# Patient Record
Sex: MALE | Race: WHITE
[De-identification: names, ages, dates, MRNs, and addresses within clinical notes are randomized per-mention and may not be internally consistent; named-entity substitution may affect disease eponyms.]

---

## 2019-03-22 ENCOUNTER — HOSPITAL ENCOUNTER (EMERGENCY)
Dept: HOSPITAL 25 - UCCORT | Age: 69
Discharge: HOME | End: 2019-03-22
Payer: COMMERCIAL

## 2019-03-22 VITALS — SYSTOLIC BLOOD PRESSURE: 183 MMHG | DIASTOLIC BLOOD PRESSURE: 90 MMHG

## 2019-03-22 DIAGNOSIS — R60.0: Primary | ICD-10-CM

## 2019-03-22 DIAGNOSIS — Z87.891: ICD-10-CM

## 2019-03-22 DIAGNOSIS — Z79.899: ICD-10-CM

## 2019-03-22 DIAGNOSIS — I10: ICD-10-CM

## 2019-03-22 DIAGNOSIS — L29.9: ICD-10-CM

## 2019-03-22 PROCEDURE — G0463 HOSPITAL OUTPT CLINIC VISIT: HCPCS

## 2019-03-22 PROCEDURE — 99201: CPT

## 2019-03-22 NOTE — UC
Lower Extremity/Ankle HPI





- HPI Summary


HPI Summary: 





Pt presents with c/o gradual onset of worsening bilateral lower extremity 

swelling, mild erythema, and slight pruritis.  Pt denies injury.  Pt does work 

night shift and stands for prolonged periods of time at work on concrete floor.

  





- History of Current Complaint


Chief Complaint: UCLowerExtremity


Stated Complaint: BILATERAL FEET/LOWER LEG SKIN


Time Seen by Provider: 03/22/19 11:18


Hx Obtained From: Patient


Onset/Duration: Gradual Onset, Lasting Days, Still Present, Worse Since - onset


Severity Initially: Mild


Severity Currently: Moderate


Pain Intensity: 3


Aggravating Factor(s): Standing


Alleviating Factor(s): Rest - slight improvement, Elevation - slight improvement


Able to Bear Weight: Yes





- Risk Factors


Gout Risk Factors: Male, Hypertension


DVT Risk Factors: Negative, Smoking


Septic Arthritis Risk Factor: Negative





- Allergies/Home Medications


Allergies/Adverse Reactions: 


 Allergies











Allergy/AdvReac Type Severity Reaction Status Date / Time


 


No Known Allergies Allergy   Verified 03/22/19 11:22











Home Medications: 


 Home Medications





Metoprolol Tartrate TAB* [Lopressor TAB*] 50 mg PO DAILY 03/22/19 [History 

Confirmed 03/22/19]


Triamterene/HCTZ 37.5-25 MG* [Dyazide CAP*] 1 cap PO DAILY 03/22/19 [History 

Confirmed 03/22/19]


amLODIPine TAB* [Norvasc 5 mg TAB*] 10 mg PO DAILY 03/22/19 [History Confirmed 

03/22/19]











PMH/Surg Hx/FS Hx/Imm Hx


Previously Healthy: Yes


Cardiovascular History: Cardiac Disease, Hypertension





- Surgical History


Surgical History: Unable to Obtain/Confirm





- Family History


Known Family History: Positive: Cardiac Disease





- Social History


Occupation: Employed Full-time


Lives: With Family


Alcohol Use: Daily


Alcohol Amount: 1 beer daily


Substance Use Type: None


Smoking Status (MU): Former Smoker


Type: Cigarettes


Have You Smoked in the Last Year: Yes





- Immunization History


Vaccination Up to Date: No





Review of Systems


All Other Systems Reviewed And Are Negative: Yes


Constitutional: Positive: Negative


Skin: Positive: Other - erythema


Eyes: Positive: Negative


ENT: Positive: Negative


Respiratory: Positive: Negative


Cardiovascular: Positive: Negative


Gastrointestinal: Positive: Negative


Genitourinary: Positive: Negative


Motor: Positive: Negative


Neurovascular: Positive: Negative


Musculoskeletal: Positive: Edema


Neurological: Positive: Negative


Psychological: Positive: Negative


Is Patient Immunocompromised?: No





Physical Exam


Triage Information Reviewed: Yes


Appearance: Well-Appearing


Vital Signs: 


 Initial Vital Signs











Temp  98.9 F   03/22/19 11:18


 


Pulse  91   03/22/19 11:18


 


Resp  16   03/22/19 11:18


 


BP  183/90   03/22/19 11:18


 


Pulse Ox  96   03/22/19 11:18











Vital Signs Reviewed: Yes


Eye Exam: Normal


ENT Exam: Normal


Dental: Positive: Gross Decay/Caries @


Neck exam: Normal


Respiratory Exam: Normal


Respiratory: Positive: Lungs clear, Normal breath sounds, No respiratory 

distress


Cardiovascular: Positive: Murmur:Sys:Grade _?_/VI - slight


Abdominal Exam: Normal


Abdomen Description: Positive: Nontender, Other: - denies increase in girth


Musculoskeletal Exam: Normal


Musculoskeletal: Positive: Edema @ - bialteral lower extremities, less than 1+ 

pitting edema bilateral


Neurological Exam: Normal


Psychological Exam: Normal


Skin Exam: Other - bilateral lower extremities mild erythema, non weeping,





Lower Extremity Course/Dx





- Course


Course Of Treatment: 





I discussed with my concerns about HTN and bilateral LE edema, pt denied CP, 

SOB.  Pt verbalized understanding and agreed to plan of care. 





- Differential Dx/Diagnosis


Provider Diagnosis: 


 Bilateral lower extremity edema








Discharge





- Sign-Out/Discharge


Documenting (check all that apply): Patient Departure


All imaging exams completed and their final reports reviewed: No Studies





- Discharge Plan


Condition: Stable


Disposition: HOME


Patient Education Materials:  Leg Edema (ED)


Referrals: 


Carmen CAI,Polo LOCKHART [Medical Doctor] - As Soon As Possible


Jair Edwards MD [Primary Care Provider] - As Soon As Possible


Additional Instructions: 


PLEASE FOLLOW UP WITH YOUR PCP AS SOON AS POSSIBLE. WE HAVE PROVIDED A NAME OF 

A CARDIOLOGIST.  THIS IS RECOMMENDATION AND YOU MAY SEE YOUR PCP FIRST TO 

DETERMINE IF YOU SHOULD FOLLOW UP WITH SOMEONE ELSE. 





- Billing Disposition and Condition


Condition: STABLE


Disposition: Home